# Patient Record
Sex: FEMALE | Race: BLACK OR AFRICAN AMERICAN | NOT HISPANIC OR LATINO | Employment: UNEMPLOYED | URBAN - METROPOLITAN AREA
[De-identification: names, ages, dates, MRNs, and addresses within clinical notes are randomized per-mention and may not be internally consistent; named-entity substitution may affect disease eponyms.]

---

## 2023-08-06 ENCOUNTER — TELEPHONE (OUTPATIENT)
Dept: EMERGENCY MEDICINE | Facility: CLINIC | Age: 38
End: 2023-08-06

## 2023-08-06 ENCOUNTER — HOSPITAL ENCOUNTER (EMERGENCY)
Facility: CLINIC | Age: 38
Discharge: HOME | End: 2023-08-06
Attending: EMERGENCY MEDICINE | Admitting: EMERGENCY MEDICINE
Payer: MEDICAID

## 2023-08-06 VITALS
TEMPERATURE: 98.2 F | OXYGEN SATURATION: 100 % | BODY MASS INDEX: 21.26 KG/M2 | HEART RATE: 81 BPM | DIASTOLIC BLOOD PRESSURE: 81 MMHG | WEIGHT: 120 LBS | SYSTOLIC BLOOD PRESSURE: 127 MMHG | HEIGHT: 63 IN | RESPIRATION RATE: 16 BRPM

## 2023-08-06 DIAGNOSIS — N89.8 VAGINAL IRRITATION: ICD-10-CM

## 2023-08-06 LAB
CLUE CELLS: ABNORMAL
TRICHOMONAS, WET PREP: ABNORMAL
WBC'S/HIGH POWER FIELD, WET PREP: ABNORMAL
YEAST, WET PREP: ABNORMAL

## 2023-08-06 PROCEDURE — 87491 CHLMYD TRACH DNA AMP PROBE: CPT | Performed by: EMERGENCY MEDICINE

## 2023-08-06 PROCEDURE — 87591 N.GONORRHOEAE DNA AMP PROB: CPT | Performed by: EMERGENCY MEDICINE

## 2023-08-06 PROCEDURE — 96361 HYDRATE IV INFUSION ADD-ON: CPT

## 2023-08-06 PROCEDURE — 99283 EMERGENCY DEPT VISIT LOW MDM: CPT

## 2023-08-06 PROCEDURE — 96374 THER/PROPH/DIAG INJ IV PUSH: CPT

## 2023-08-06 PROCEDURE — 96375 TX/PRO/DX INJ NEW DRUG ADDON: CPT

## 2023-08-06 PROCEDURE — 93005 ELECTROCARDIOGRAM TRACING: CPT

## 2023-08-06 PROCEDURE — 87210 SMEAR WET MOUNT SALINE/INK: CPT | Performed by: EMERGENCY MEDICINE

## 2023-08-06 RX ORDER — FLUCONAZOLE 150 MG/1
150 TABLET ORAL ONCE
Qty: 1 TABLET | Refills: 0 | Status: SHIPPED | OUTPATIENT
Start: 2023-08-06 | End: 2023-08-06

## 2023-08-06 RX ORDER — DOXYCYCLINE 100 MG/1
100 CAPSULE ORAL 2 TIMES DAILY
Qty: 14 CAPSULE | Refills: 0 | Status: SHIPPED | OUTPATIENT
Start: 2023-08-06 | End: 2023-08-06

## 2023-08-06 ASSESSMENT — ACTIVITIES OF DAILY LIVING (ADL): ADLS_ACUITY_SCORE: 35

## 2023-08-06 NOTE — TELEPHONE ENCOUNTER
[facility] () Emergency Department/Urgent Care Lab result notification  [Note:  ED Lab Results RN will reference the [facility] Emergency Dept visit note prior to contacting patient AND/OR prior to consulting Emergency Dept Provider.  Highlights of Emergency Dept visit in information summary at the bottom of this telephone note]    1. Reason for call  Notify of lab results  Assess patient symptoms [if necessary]  Review ED Providers recommendations/discharge instructions (if necessary)  Advise per [facility] ED lab result protocol    2. Lab Result (including Rx patient on, if applicable).  If culture, copy of lab report at bottom.  Chlamydia/Gonorrhea    3. RN Assessment (Patient's current Symptoms):  Time of call: 8/6/2023 4:34 PM  Assessment: patient calling for lab results chlamydia/gonorrhea she was seen in ED 8/6/2023 and discharged with diflucan for possible yeast infection and hasn't picked up rx at this time.     4. RN Recommendations/Instructions per [facility] ED lab result protocol  [facility] ED lab result protocol used: chlamydia/gonorrheae  Patient was notified of lab result and treatment recommendations  RN reviewed information about labs currently 'in process' lab guide advises 2-3 days for processing, encouraged picking up diflucan and reviewd goodrx coupon for discount as she mentioned cost may be of concern, we reviewed that ED results team doesn't call on negative results, we only call on positive results, labs are available on [employer], and she is always welcome to call for her lab results -  [phone].  RN is available every day between 9 a.m. and 5:30 p.m.  Patient verbalized understanding and ended call.        Leslie Roberts RN  [facility] [employer] [place]  Emergency Dept Lab Result RN  Ph# [phone]

## 2023-08-06 NOTE — DISCHARGE INSTRUCTIONS
We discussed treatment.  Please follow up in clinic for further STD testing.  Please sign up for my chart to receive your results when the come back.

## 2023-08-06 NOTE — ED PROVIDER NOTES
"  History     Chief Complaint:  Eye Problem and Vaginal Itching       HPI   Supriya Smart is a 38 year old female who presents with vaginal irritation.  She reports over the last few days she has had vaginal irritation and itching.  She had tried to make a clinic appointment, but had missed it due to another conflict.  She has noted some minor vaginal discharge.  No abdominal pain or fevers.  No pain or difficulty urinating.  She has not taken anything for her symptoms.  Pt does bring up the concern for possible STD.          Allergies:  Penicillins     Medications:    None      Past Medical History:    No past medical history on file.    Past Surgical History:    No past surgical history on file.     Family History:    family history is not on file.    Social History:     PCP: No Ref-Primary, Physician     Physical Exam   Patient Vitals for the past 24 hrs:   BP Temp Temp src Pulse Resp SpO2 Height Weight   08/06/23 0406 127/81 98.2  F (36.8  C) Temporal 81 16 100 % 1.588 m (5' 2.5\") 54.4 kg (120 lb)        Physical Exam  General: Appears well-developed and well-nourished.   Head: No signs of trauma.   CV: Normal rate and regular rhythm.    Resp: Effort normal and breath sounds normal. No respiratory distress.   GI: Soft. There is no tenderness.  No rebound or guarding.  Normal bowel sounds.  No CVA tenderness.  : Vaginal discharge noted.  No significant cervical motion tenderness.  MSK: Normal range of motion.   Neuro: The patient is alert and oriented. Speech normal.    Skin: Skin is warm and dry. No rash noted.   Psych: normal mood and affect. behavior is normal.       Emergency Department Course     Laboratory:  Labs Ordered and Resulted from Time of ED Arrival to Time of ED Departure   WET PREPARATION - Abnormal       Result Value    Trichomonas Absent      Yeast Absent      Clue Cells Absent      WBCs/high power field 1+ (*)    CHLAMYDIA TRACHOMATIS PCR   NEISSERIA GONORRHOEAE PCR          Emergency " Department Course & Assessments:    Interventions:  Medications - No data to display       Disposition:  The patient was discharged to home.     Impression & Plan    Medical Decision Making:  Supriya Smart is a 38 year old female presents due to vaginal itching and irritation.  She states that this has been going on for a number of days.  She tried to go to her appointment a couple of days ago but had missed it due to other conflicts.  On my evaluation she did have some vaginal discharge.  There is no clinical signs for PID at this point.  Patient did bring up the possibility of an STD.  I did obtain chlamydia and gonorrhea swabs.  Given the patient's concern, I did discuss and recommend empiric treatment with ceftriaxone and doxycycline.  Patient states that she did not want to do this, preferring to wait for the test results.  After discussion with the patient, this was the plan.  I did give the patient a prescription for Diflucan, as I was concerned for a yeast infection despite negative wet prep.  I did stressed the importance of following up in clinic for further STD testing and recheck.      Diagnosis:    ICD-10-CM    1. Vaginal irritation  N89.8            Discharge Medications:  Discharge Medication List as of 8/6/2023  5:15 AM              Darrel Greene MD  08/06/23 0635       Darrel Greene MD  08/31/23 1133

## 2023-08-07 ENCOUNTER — TELEPHONE (OUTPATIENT)
Dept: EMERGENCY MEDICINE | Facility: CLINIC | Age: 38
End: 2023-08-07
Payer: MEDICAID

## 2023-08-07 LAB
C TRACH DNA SPEC QL NAA+PROBE: NEGATIVE
N GONORRHOEA DNA SPEC QL NAA+PROBE: NEGATIVE

## 2023-08-07 NOTE — TELEPHONE ENCOUNTER
[facility] Emergency Department Lab result notification     Caller/Patient name  Supriya    Reason for call  Patient requesting lab result    Lab Result  Component      Latest Ref Rng 8/6/2023  4:35 AM   Trichomonas      Absent  Absent    Chlamydia Trachomatis PCR      Negative  Negative    N Gonorrhea PCR      Negative  Negative       Legend:  ! Abnormal  Current symptoms  When I took a shower and I got soap down there it really butned a lot  No other sx's    Recommendations/Instructions  Notified of negative results  Encouraged to avoid soap in the private area.  Could be related to the yeast infection  Discuss with PCP if sx's persist.    Contact your PCP clinic or return to the Emergency department if your:  Symptoms worsen or other concerning symptom's.      Bear Torres RN  [facility] [employer] [place]  Emergency Dept Lab Result RN  Ph# [phone]

## 2023-08-13 ENCOUNTER — HOSPITAL ENCOUNTER (EMERGENCY)
Facility: CLINIC | Age: 38
Discharge: HOME | End: 2023-08-13
Admitting: EMERGENCY MEDICINE
Payer: MEDICAID

## 2023-08-13 VITALS
RESPIRATION RATE: 20 BRPM | BODY MASS INDEX: 20.49 KG/M2 | HEART RATE: 87 BPM | SYSTOLIC BLOOD PRESSURE: 125 MMHG | WEIGHT: 120 LBS | DIASTOLIC BLOOD PRESSURE: 83 MMHG | HEIGHT: 64 IN | TEMPERATURE: 98 F | OXYGEN SATURATION: 100 %

## 2023-08-13 PROCEDURE — 99281 EMR DPT VST MAYX REQ PHY/QHP: CPT

## 2023-08-13 NOTE — ED TRIAGE NOTES
Pt complains of shortness of breath, vaginal itching.     Triage Assessment       Row Name 08/13/23 2254       Triage Assessment (Adult)    Airway WDL WDL       Respiratory WDL    Respiratory WDL WDL       Skin Circulation/Temperature WDL    Skin Circulation/Temperature WDL X  vaginal itching       Cardiac WDL    Cardiac WDL WDL       Peripheral/Neurovascular WDL    Peripheral Neurovascular WDL WDL       Cognitive/Neuro/Behavioral WDL    Cognitive/Neuro/Behavioral WDL WDL